# Patient Record
Sex: MALE | Race: WHITE | URBAN - METROPOLITAN AREA
[De-identification: names, ages, dates, MRNs, and addresses within clinical notes are randomized per-mention and may not be internally consistent; named-entity substitution may affect disease eponyms.]

---

## 2017-01-07 ENCOUNTER — TRANSFERRED RECORDS (OUTPATIENT)
Dept: HEALTH INFORMATION MANAGEMENT | Facility: CLINIC | Age: 32
End: 2017-01-07

## 2017-01-09 ENCOUNTER — MEDICAL CORRESPONDENCE (OUTPATIENT)
Dept: HEALTH INFORMATION MANAGEMENT | Facility: CLINIC | Age: 32
End: 2017-01-09

## 2017-03-03 ENCOUNTER — OFFICE VISIT (OUTPATIENT)
Dept: FAMILY MEDICINE | Facility: CLINIC | Age: 32
End: 2017-03-03

## 2017-03-03 DIAGNOSIS — M43.17 SPONDYLOLISTHESIS OF LUMBOSACRAL REGION: Primary | ICD-10-CM

## 2017-03-03 RX ORDER — HYDROCODONE BITARTRATE AND ACETAMINOPHEN 5; 325 MG/1; MG/1
1 TABLET ORAL
Qty: 15 TABLET | Refills: 0 | Status: SHIPPED | OUTPATIENT
Start: 2017-03-03

## 2017-03-03 ASSESSMENT — ANXIETY QUESTIONNAIRES
2. NOT BEING ABLE TO STOP OR CONTROL WORRYING: NOT AT ALL
1. FEELING NERVOUS, ANXIOUS, OR ON EDGE: NOT AT ALL
6. BECOMING EASILY ANNOYED OR IRRITABLE: NOT AT ALL
7. FEELING AFRAID AS IF SOMETHING AWFUL MIGHT HAPPEN: NOT AT ALL
IF YOU CHECKED OFF ANY PROBLEMS ON THIS QUESTIONNAIRE, HOW DIFFICULT HAVE THESE PROBLEMS MADE IT FOR YOU TO DO YOUR WORK, TAKE CARE OF THINGS AT HOME, OR GET ALONG WITH OTHER PEOPLE: SOMEWHAT DIFFICULT
5. BEING SO RESTLESS THAT IT IS HARD TO SIT STILL: NOT AT ALL
3. WORRYING TOO MUCH ABOUT DIFFERENT THINGS: NOT AT ALL
GAD7 TOTAL SCORE: 0

## 2017-03-03 ASSESSMENT — PAIN SCALES - GENERAL: PAINLEVEL: SEVERE PAIN (7)

## 2017-03-03 ASSESSMENT — PATIENT HEALTH QUESTIONNAIRE - PHQ9: 5. POOR APPETITE OR OVEREATING: NOT AT ALL

## 2017-03-03 NOTE — PROGRESS NOTES
HPI:       Castro Martinez is a 31 year old who presents for the following  Patient presents with:  Back Pain    [facility]. He seeks care on his back pain.     He reports he was told he has unstable spondylolisthesis of L5-S1, and his back pain is impacting his quality of life. He is requesting a consult with a surgeon to discuss what surgery would look like even though he knows he is not at this point now, what might it look like in the future. He reports there are activites in the past that he'd like to resume, lifting heavy weights, running, outdoor activities, rock climbing, and wrestling and he is wondering if having surgery would allow him to be more active.   Back Pain      Duration: Patient reports back pain started in [id number] while he was in the  and doing heavy lifting.         Specific cause: lifting    Description:   Location of pain: low back right  Character of pain: sharp, dull ache and constant. He feels the low back pain also creates a muscle tension pain for his upper back. On days with severe pain, it impacts his sleep and he has difficulty sleeping.  Pain radiation:radiates into the right leg and radiates into the left leg, he reports the bilateral radiation wraps around the back of his thighs then wraps around toward the front of his lower legs, it is a numb feeling.   New numbness or weakness in legs, not attributed to pain:  No   Any new bowel or bladder incontinence?    No     Intensity: Currently 4/10, At its worst 9/10    History:   Pain interferes with job/home/school: No, on a bad day of pain he can work from home.    History of back problems: Has seen previous providers and was told he has unstable spondylolisthesis at L5-S1 with a 9-12 mm shift in L5-S1. He has seen previous providers, some giving him activity restriction such as telling him not to run and be active while others tell him he  "can. He would ultimately like a surgery consult to discuss at what point might he need surgery even though he knows that time is not now. He would like a surgeons opinion on what activity level is appropriate. He would like to find out more on what surgery would be like if he needs surgery. He feels he been given conflicting advice by different providers and would like a surgeons input.  Any previous MRI or X-rays: Yes- at [facility].  Date 1/7/17 Flexion and extension xrays of lumbar spine. [facility] Also had MRI done in 2012 in [place]. In the past he had xrays done in 2008/2009 and was initially told he had DDD.   Sees a specialist for back pain:  No, did see pain management and opted out of an injection. [facility] suggested an [facility] as his next step.  Therapies tried without relief: acetaminophen (Tylenol), activity, cold, heat, muscle relaxants, NSAIDs, Physical Therapy and stretch. He reports he's taken enough Flexeril \"to kill a mule.\" While he was in the  they provided him with various NSAIDs, he recalls taking Ibuprofen 800 mg po 3-4 x a day. He reports this was hard on his stomach. Flexeril makes him groggy the following day. He still uses a NSAID for the regular day to day pain.      Alleviating factors:   Improved by: opioids and stretch, decompression helps, he saw a chiropractor in 2012/2013 and this seemed to help, yoga helps. He knows what he needs to do to help alleviate the pain when pain is severe. He uses a foam roller, also a soft ball to loosen up hips, decompressing and touching toes, takes pressure off with laying flat. He did PT in 2016, [facility] and [facility] session through  of . He still does the exercises at home that he was taught. He takes his NSAIDs 4 days a week, doesn't use them daily.    Precipitating factors:  Worsened by: Lifting, Bending, Standing and Sitting    Accompanying Signs & Symptoms:  Risk of Fracture: No   Risk of " Cauda Equina:No   Risk of Infection:  No   Risk of Cancer:  No        Problem, Medication and Allergy Lists were reviewed and are current.  Patient is a new patient to this clinic and so  I reviewed/updated the Past Medical History, the Family History and the Social History.          Review of Systems:   Review of Systems    ROS: 10 point ROS neg other than the symptoms noted above in the HPI.         Physical Exam:     Patient Vitals for the past 24 hrs:   BP Pulse   03/06/17 0819 127/85 74     Body mass index is 31.42 kg/(m^2).  Vitals were reviewed and were normal     Physical Exam   Constitutional: He is oriented to person, place, and time. He appears well-developed and well-nourished.   HENT:   Head: Normocephalic and atraumatic.   Mouth/Throat: Oropharynx is clear and moist.   Eyes: Pupils are equal, round, and reactive to light.   Neck: Neck supple.   Cardiovascular: Normal rate, regular rhythm and normal heart sounds.    Pulmonary/Chest: Effort normal and breath sounds normal.   Musculoskeletal:        Lumbar back: He exhibits tenderness, pain and spasm. He exhibits no edema and no deformity.   Lymphadenopathy:     He has no cervical adenopathy.   Neurological: He is alert and oriented to person, place, and time. He has normal strength. No cranial nerve deficit or sensory deficit.   Reflex Scores:       Patellar reflexes are 2+ on the right side and 2+ on the left side.  Negative SLR test   Skin: Skin is warm and dry.   Psychiatric: He has a normal mood and affect. His behavior is normal.         Results:      Results from the last 24 hoursNo results found for this or any previous visit (from the past 24 hour(s)).  Assessment and Plan     Castro was seen today for back pain.    Diagnoses and all orders for this visit:    1. Spondylolisthesis of lumbosacral region  Hank, 31 year old male with back pain that started in 2004 while working in the  reports hx of unstable spondylolisthesis diagnosed  [facility] and [facility], I was unable to view that imaging today. JULIUS score today is 17/50 = 34%, Global Health score is 27.   Discussed getting MRI and getting his Xray images done 2 months ago on a a disk to prepare and give all the needed information to the surgeon to help prepare for his visit. Overall, patient would like a surgeons opinion on how bad his back is with his unstable spondylolisthesis, if/when he would need surgery, and how he can go about getting his life back with resuming the activities he enjoys. He pain ranges from mild to some days severe. He does not take NSAIDs, daily, 4 x a week. He was provided small amount of Norco for days when the pain is severe.   - SPINE SURGERY REFERRAL  - HYDROcodone-acetaminophen (NORCO) 5-325 MG per tablet; Take 1 tablet by mouth nightly as needed for moderate to severe pain  Dispense: 15 tablet; Refill: 0  - MR Lumbar Spine w/o Contrast; Future    Educated patient to continue to monitor, reviewed handout below, all questions answered. Call clinic if worsening or no improvement. FU with spine consult per patient request. Ordered MRI through [facility], patient will get all images including MRI on disk.      There are no discontinued medications.  Options for treatment and follow-up care were reviewed with the patient. Castro Martinez  engaged in the decision making process and verbalized understanding of the options discussed and agreed with the final plan.    Breanne Ariza, LESA    Patient Instructions       Common Spine and Disk Problems  The most common serious back problems happen when disks tear, bulge, or rupture. In such cases, an injured disk can no longer cushion the vertebrae and absorb shock. As a result, the rest of your spine may also weaken. This can lead to pain, stiffness, and other symptoms.     Torn annulus      Contained herniated disk      Extruded herniated disk     Torn annulus. A sudden movement may cause a  tiny tear in an annulus. Nearby ligaments may stretch.    Contained herniated disk. As a disk wears out, the nucleus may bulge into the annulus and press on nerves.    Extruded herniated disk. When a disk ruptures, its nucleus can squeeze out and irritate a nerve.     Arthritis      Instability      Spondylolisthesis     Arthritis. As disks wear out over time, bone spurs form. These growths can irritate nerves and inflame facets.    Instability. As a disk stretches, the vertebrae slip back and forth. This can put pressure on the annulus.    Spondylolisthesis. Listhesis is a condition in which one vertebra has moved forward or backward, in relation to the one above or below it. This causes a crack (stress fracture) in the areas that link the vertebrae together. This may put pressure on the annulus, stretch the disk, and irritate nerves.    8657-5235 [employer]. [address] [place] [address]. All rights reserved. This information is not intended as a substitute for professional medical care. Always follow your healthcare professional's instructions.

## 2017-03-03 NOTE — MR AVS SNAPSHOT
After Visit Summary   3/3/2017    Castro Martinez    MRN: [id number]           Patient Information     Date Of Birth          1985        Visit Information        Provider Department      3/3/2017 1:00 PM Breanne Ariza NP [facility]        Today's Diagnoses     Spondylolisthesis of lumbosacral region    -  1       Follow-ups after your visit        Additional Services     SPINE SURGERY REFERRAL       Please choose Medical Spine Specialist (unless patient was seen by a Medical Spine Specialist within the past 6 months).  Surgical Evaluation is advised if the patient presents with one or more of the following red flags:     **Cauda Equina Syndrome  **Evidence of Spinal Tumor  **Fracture  **Infection  **Loss of Bowel or Bladder Control  **Sudden or Progressive Weakness  **Any other documented emergent neurological condition resulting from a Lumbar Spinal Condition.    You have been referred to: Spine Lumbar: Spine Surgeon: [facility]: Neurosurgery Clinic [facility] [phone]   [email or web address]    Please be aware that coverage of these services is subject to the terms and limitations of your health insurance plan.  Call member services at your health plan with any benefit or coverage questions.      Please bring the following to your appointment:    **Any x-rays, CTs or MRIs which have been performed.  Contact the facility where they were done to arrange for  prior to your scheduled appointment.    **List of current medications   **This referral request   **Any documents/labs given to you regarding this referral                  Who to contact     Please call your clinic at [phone] to:    Ask questions about your health    Make or cancel appointments    Discuss your medicines    Learn about your test results    Speak to your doctor   If you have compliments or concerns about an experience at your clinic, or if you wish to file a  "complaint, please contact [facility] Patient Relations at [phone] or email us at [email or web address]         Additional Information About Your Visit        [employer] Information     [employer] is an electronic gateway that provides easy, online access to your medical records. With [employer], you can request a clinic appointment, read your test results, renew a prescription or communicate with your care team.     To sign up for [employer] visit the website at [email or web address]   You will be asked to enter the access code listed below, as well as some personal information. Please follow the directions to create your username and password.     Your access code is: [email or web address]  Expires: 2017  2:02 PM     Your access code will  in 90 days. If you need help or a new code, please contact your [facility] or call [phone] for assistance.        Care EveryWhere ID     This is your Care EveryWhere ID. This could be used by other organizations to access your [place] medical records  [id number]        Your Vitals Were     Pulse Height Pulse Oximetry BMI (Body Mass Index)          93 5' 7.6\" (171.7 cm) 96% 31.42 kg/m2         Blood Pressure from Last 3 Encounters:   17 145/87    Weight from Last 3 Encounters:   17 204 lb 3.2 oz (92.6 kg)              We Performed the Following     SPINE SURGERY REFERRAL        Primary Care Provider    Md Wendi Castro                Thank you!     Thank you for choosing [facility]  for your care. Our goal is always to provide you with excellent care. Hearing back from our patients is one way we can continue to improve our services. Please take a few minutes to complete the written survey that you may receive in the mail after your visit with us. Thank you!             Your Updated Medication List - Protect others around you: Learn how to safely use, store and throw away your " medicines at www.disposemymeds.org.          This list is accurate as of: 3/3/17  2:02 PM.  Always use your most recent med list.                   Brand Name Dispense Instructions for use    ALEVE PO      Take 220 mg by mouth 2 times daily (with meals)

## 2017-03-04 ASSESSMENT — PATIENT HEALTH QUESTIONNAIRE - PHQ9: SUM OF ALL RESPONSES TO PHQ QUESTIONS 1-9: 4

## 2017-03-04 ASSESSMENT — ANXIETY QUESTIONNAIRES: GAD7 TOTAL SCORE: 0

## 2017-03-06 VITALS
SYSTOLIC BLOOD PRESSURE: 127 MMHG | BODY MASS INDEX: 30.95 KG/M2 | OXYGEN SATURATION: 96 % | HEIGHT: 68 IN | HEART RATE: 74 BPM | WEIGHT: 204.2 LBS | DIASTOLIC BLOOD PRESSURE: 85 MMHG

## 2017-03-06 PROBLEM — M43.17 SPONDYLOLISTHESIS OF LUMBOSACRAL REGION: Status: ACTIVE | Noted: 2017-03-06

## 2017-03-17 ENCOUNTER — TRANSFERRED RECORDS (OUTPATIENT)
Dept: HEALTH INFORMATION MANAGEMENT | Facility: CLINIC | Age: 32
End: 2017-03-17

## 2017-03-20 ENCOUNTER — TELEPHONE (OUTPATIENT)
Dept: FAMILY MEDICINE | Facility: CLINIC | Age: 32
End: 2017-03-20

## 2017-03-20 DIAGNOSIS — M43.17 SPONDYLOLISTHESIS OF LUMBOSACRAL REGION: Primary | ICD-10-CM

## 2017-03-20 NOTE — TELEPHONE ENCOUNTER
Called Hank and reviewed [facility] imaging report received on his lumbar spine without contrast mri. Discussed severe L5-S1 disc degeneration with 7 mm chronic spondylolytic spondylolisthesis and foraminal stenosis due to spondylolisthesis and foraminal disc bulge/protrusion. Referral to spine surgeon provided. He will bring all imaging with him to appointment of MRI and Xray of lumbar. Call back if questions or any difficulty getting referral to spine surgery.

## 2017-03-23 ENCOUNTER — PRE VISIT (OUTPATIENT)
Dept: ORTHOPEDICS | Facility: CLINIC | Age: 32
End: 2017-03-23

## 2017-03-23 NOTE — TELEPHONE ENCOUNTER
1.  Date/reason for appt: 4/27/17- DDD lumbar and spondy and stenosis    2.  Referring provider: Breanne Ariza NP - internal referral, office note 3/3/17 in [facility].     3.  Call to patient (Yes / No - short description): Yes, LM to call back. Need IMMANUEL's for outside records.     4.  Previous care at / records requested [facility]:     [facility] - [facility]   2. [facility] - Faxed to push images   3. Imaging done in [place] - Need to know name of location and IMMANUEL

## 2017-03-24 NOTE — TELEPHONE ENCOUNTER
Radiology reports received.   MR lumbar on 3/17/17-[facility]  Xray lumbar on 1/7/17-[facility]  Lumbar spine on 2/9/16-[place]  Cervical on 2/9/16- [facility]

## 2017-03-24 NOTE — TELEPHONE ENCOUNTER
Imaging was uploaded to PACS from [facility].     MR L Spine 3/17/17  XR L Spine 1/7/17  XR L Spine 12/2/13

## 2017-03-24 NOTE — TELEPHONE ENCOUNTER
Possible records at [facility]. - Need IMMANUEL.     E-mailed [facility] to pull images from [facility].

## 2017-03-28 NOTE — TELEPHONE ENCOUNTER
Spoke with patient on the phone - No previous back surgery.     Records are with:    1. [facility]   2. [place]  3. [facility]     E-mailed IMMANUEL's to sign - [email or web address]

## 2017-04-04 NOTE — TELEPHONE ENCOUNTER
Records received from [place].   Included  Office notes: 2/17/16, 2/9/16, 5/2/15   PT notes: 3/1/16   [facility] notes: 2/29/16, 2/18/16, 12/17/15  Radiology reports: xray lumbar on 2/9/16   Xray ribs on 5/2/15

## 2017-04-04 NOTE — TELEPHONE ENCOUNTER
Forwarded the radiology reports from [facility] to [facility].     Only received 2 reports  -4/21/15 MRI L Spine  -2/5/15 Xray Lower Spine

## 2017-04-04 NOTE — TELEPHONE ENCOUNTER
[facility] sent [facility] to [facility].     -12/2/13 - Spine Lumbosacral   -2/5/15 - Xray Lower Spine  -1/7/17 Non [facility] Lumbar Spine  -4/21/15 MR L Spine

## 2017-04-05 NOTE — TELEPHONE ENCOUNTER
Records received from [facility].   Included  Office notes: 4/28/16, 11/18/15, 2/5/15   PT note on 3/26/15, 2/19/15  Radiology reports: MRI lumbar on 4/21/15   Lumbar on 2/5/16   Xray lumbar on 1/7/17-[facility]   Lumbar on 12/2/13

## 2017-04-13 NOTE — TELEPHONE ENCOUNTER
Radiology reports received from [facility] - Forwarded to Ortho Clinic.   (Waiting for imaging disc.)     Reports include:  Elbow: 5/24/10 (appears unrelated)   Ankle: 5/24/10, 7/22/11  (appears unrelated)   Spine Lumbar: 9/17/10, 12/2/13  MR L-Spine: 10/29/10  MR Any Joint: 10/29/10, 01/07/11  (appears unrelated)   CT Maxillofacial: 11/5/10  (appears unrelated)   CT Head: 11/5/10  (appears unrelated)   Spine Cervical: 12/2/13  Spine Thoracic: 12/2/13

## 2017-04-13 NOTE — TELEPHONE ENCOUNTER
Records received from [facility] - Forwarded records to Ortho.     Records include:   Consult Requests  Progress Notes [id number]  Acupuncture Notes 1/14/13  PT notes    Op Note Septoplasty 3/31/11 (Not Related)     Missing: Radiology reports and imaging

## 2017-04-17 NOTE — TELEPHONE ENCOUNTER
Disc received from [facility] - sent to hospital film room.     18 studies   Exam dates: 5/24/10-12/02/13

## 2017-04-27 ENCOUNTER — OFFICE VISIT (OUTPATIENT)
Dept: ORTHOPEDICS | Facility: CLINIC | Age: 32
End: 2017-04-27

## 2017-04-27 VITALS — HEIGHT: 68 IN | WEIGHT: 206.6 LBS | BODY MASS INDEX: 31.31 KG/M2

## 2017-04-27 DIAGNOSIS — M43.17 SPONDYLOLISTHESIS OF LUMBOSACRAL REGION: Primary | ICD-10-CM

## 2017-04-27 DIAGNOSIS — M54.9 BACK PAIN, UNSPECIFIED BACK LOCATION, UNSPECIFIED BACK PAIN LATERALITY, UNSPECIFIED CHRONICITY: Primary | ICD-10-CM

## 2017-04-27 ASSESSMENT — ENCOUNTER SYMPTOMS
BACK PAIN: 1
NECK PAIN: 1
MUSCLE WEAKNESS: 0
JOINT SWELLING: 0
MYALGIAS: 0
STIFFNESS: 0
ARTHRALGIAS: 0
MUSCLE CRAMPS: 0

## 2017-04-27 NOTE — MR AVS SNAPSHOT
After Visit Summary   2017    Castro Martinez    MRN: [id number]           Patient Information     Date Of Birth          1985        Visit Information        Provider Department      2017 2:20 PM Bartolo Carmona MD [facility]        Today's Diagnoses     Spondylolisthesis of lumbosacral region    -  1       Follow-ups after your visit        Follow-up notes from your care team     Return if symptoms worsen or fail to improve.      Who to contact     Please call your clinic at [phone] to:    Ask questions about your health    Make or cancel appointments    Discuss your medicines    Learn about your test results    Speak to your doctor   If you have compliments or concerns about an experience at your clinic, or if you wish to file a complaint, please contact [facility] Patient Relations at [phone] or email us at [email or web address]         Additional Information About Your Visit        MyChart Information     [employer] is an electronic gateway that provides easy, online access to your medical records. With [facility], you can request a clinic appointment, read your test results, renew a prescription or communicate with your care team.     To sign up for [employer] visit the website at [email or web address]   You will be asked to enter the access code listed below, as well as some personal information. Please follow the directions to create your username and password.     Your access code is: [email or web address]  Expires: 2017  3:02 PM     Your access code will  in 90 days. If you need help or a new code, please contact your [facility] or call [phone] for assistance.        Care EveryWhere ID     This is your Care EveryWhere ID. This could be used by other organizations to access your [place] medical records  [id number]        Your Vitals Were     Height BMI (Body Mass  "Index)                1.72 m (5' 7.72\") 31.68 kg/m2           Blood Pressure from Last 3 Encounters:   03/06/17 127/85    Weight from Last 3 Encounters:   04/27/17 93.7 kg (206 lb 9.6 oz)   03/03/17 92.6 kg (204 lb 3.2 oz)              Today, you had the following     No orders found for display       Primary Care Provider    Md Other Clinic                Thank you!     Thank you for choosing [facility]  for your care. Our goal is always to provide you with excellent care. Hearing back from our patients is one way we can continue to improve our services. Please take a few minutes to complete the written survey that you may receive in the mail after your visit with us. Thank you!             Your Updated Medication List - Protect others around you: Learn how to safely use, store and throw away your medicines at www.disposemymeds.org.          This list is accurate as of: 4/27/17 11:59 PM.  Always use your most recent med list.                   Brand Name Dispense Instructions for use    ALEVE PO      Take 220 mg by mouth 2 times daily (with meals)       HYDROcodone-acetaminophen 5-325 MG per tablet    NORCO    15 tablet    Take 1 tablet by mouth nightly as needed for moderate to severe pain         "

## 2017-04-27 NOTE — PROGRESS NOTES
CHIEF COMPLAINT:  Low back pain.      HISTORY OF PRESENT ILLNESS:  The patient is a very pleasant, otherwise healthy, 32-year-old gentleman who has a known history of lytic spondylolisthesis at the L5-S1 junction.  The patient was previously seen by Dr. Carmona 2 years ago at the [facility], where it was recommended that the patient undergo physical therapy and use NSAIDs for pain relief.  He has done this, and overall has done very well.  He has come in today for followup examination and to further discuss any restrictions.      In the interval time period, he has not had much change in his symptoms.  He has occasional right-sided numbness and tingling running down the posterior aspect of his leg, which occurs about once a month.  He has fairly frequent focal low back pain described as an ache that is worse with activities.  He only takes Aleve p.o. for pain relief.  He is not taking any narcotics.  He has had no significant trauma.      PAST MEDICAL HISTORY:  None.      PAST SURGICAL HISTORY:  None.      ALLERGIES:  None.      FAMILY HISTORY:  Noncontributory.      SOCIAL HISTORY:  The patient denies tobacco use.  He drinks alcohol only occasionally.  He is currently fully employed, and has a sedentary job.  [facility]      REVIEW OF SYSTEMS:  A 10-point review of systems was negative; see the addendum below.      PHYSICAL EXAMINATION:  In general, he is well-developed, well-nourished, in no acute distress.  Weight today is 206 with a calculated BMI of 31.7.  He communicates and articulates well and with a normal affect.  He is breathing comfortably on room air.  His pulse is regular rate and rhythm.  He has normal skin with no evidence of skin breakdown or rash.  Focused exam of his spine reveals that he has no areas that are focally tender to palpation; no tenderness along the midline lumbar spine, and no tenderness along the paraspinal musculature.  There is no quadratus lumborum spasming.   Hyperextension of the lumbar spine does not produce any pain, and facet loading does not produce any pain symptoms.  He has 5/5 strength in his bilateral extremities grossly, and sensation is fully intact.  He has a normal reciprocal heel-toe gait.      IMAGING:  We have a full-length standing film from today available for review, which demonstrates a chronic lytic spondylolisthesis of L5 on S1.  There are extension/flexion films from 01/7/2017 available for review, which demonstrate approximately 4 mm translation of L5 on S1 with extension/flexion, suggesting very mild instability.      There is an MRI available for review from 03/17/2017.  This demonstrates degenerative disc at the L5-S1 level.  Laterally, there is some mild stenosis of the foramen bilaterally at L5-S1.  The remaining discs appear very healthy.  There is no central canal stenosis identified.      ASSESSMENT:  A 32-year-old gentleman, otherwise healthy, with chronic lytic spondylolisthesis L5-S1 with minimal symptoms.      PLAN:  We had a long conversation with the patient regarding the above.  At this juncture, we certainly would not recommend surgical intervention, as his symptoms are pretty mild and are well controlled with oral pain medications only.  He has already done physical therapy, and states that he keeps himself healthy doing physical therapy on his own, as well as going to the gym.      We had no specific restrictions to offer him.  He may do whatever activity he wishes, and our only recommendation is to allow his pain symptoms to guide him; so if a particular activity causes focal low back pain, then we would advise him to ease up on that particular activity.      The patient may follow up with us on a p.r.n. basis.   Dictated by Frankie Cage MD, Resident       Addendum:  I personally saw and evaluated the patient with PGY-4 [place], and I agree with findings and plan outlined in the note.  Previously diagnosed with L5-S1 isthmic  spondylolisthesis and foraminal stenosis.  Previously seen at [facility] ago, agreed to continued with nonoperative treatment.  At present, symptoms not disabling, and he is not desiring surgical treatment.  We had a good discussion regarding his condition, prognosis down the road, what he can and should not be doing, etc.  I tried to answer his questions as best as I could and to his satisfaction.  RTC prn if symptoms get worse and would like to discuss / consider surgical options.  TT > 30 mins, > 50% CC.

## 2017-04-27 NOTE — LETTER
4/27/2017     RE: Castro Martinez  [address]   [facility] [address]     Dear Colleague,    Thank you for referring your patient, Castro Martinez, to the [facility] at [facility]. Please see a copy of my visit note below.    CHIEF COMPLAINT:  Low back pain.      HISTORY OF PRESENT ILLNESS:  The patient is a very pleasant, otherwise healthy, 32-year-old gentleman who has a known history of lytic spondylolisthesis at the L5-S1 junction.  The patient was previously seen by Dr. Carmona 2 years ago at the [facility], where it was recommended that the patient undergo physical therapy and use NSAIDs for pain relief.  He has done this, and overall has done very well.  He has come in today for followup examination and to further discuss any restrictions.      In the interval time period, he has not had much change in his symptoms.  He has occasional right-sided numbness and tingling running down the posterior aspect of his leg, which occurs about once a month.  He has fairly frequent focal low back pain described as an ache that is worse with activities.  He only takes Aleve p.o. for pain relief.  He is not taking any narcotics.  He has had no significant trauma.      PAST MEDICAL HISTORY:  None.      PAST SURGICAL HISTORY:  None.      ALLERGIES:  None.      FAMILY HISTORY:  Noncontributory.      SOCIAL HISTORY:  The patient denies tobacco use.  He drinks alcohol only occasionally.  He is currently fully employed, and has a sedentary job.  [facility].      REVIEW OF SYSTEMS:  A 10-point review of systems was negative; see the addendum below.      PHYSICAL EXAMINATION:  In general, he is well-developed, well-nourished, in no acute distress.  Weight today is 206 with a calculated BMI of 31.7.  He communicates and articulates well and with a normal affect.  He is breathing comfortably on room air.  His pulse is regular rate  and rhythm.  He has normal skin with no evidence of skin breakdown or rash.  Focused exam of his spine reveals that he has no areas that are focally tender to palpation; no tenderness along the midline lumbar spine, and no tenderness along the paraspinal musculature.  There is no quadratus lumborum spasming.  Hyperextension of the lumbar spine does not produce any pain, and facet loading does not produce any pain symptoms.  He has 5/5 strength in his bilateral extremities grossly, and sensation is fully intact.  He has a normal reciprocal heel-toe gait.      IMAGING:  We have a full-length standing film from today available for review, which demonstrates a chronic lytic spondylolisthesis of L5 on S1.  There are extension/flexion films from 01/7/2017 available for review, which demonstrate approximately 4 mm translation of L5 on S1 with extension/flexion, suggesting very mild instability.      There is an MRI available for review from 03/17/2017.  This demonstrates degenerative disc at the L5-S1 level.  Laterally, there is some mild stenosis of the foramen bilaterally at L5-S1.  The remaining discs appear very healthy.  There is no central canal stenosis identified.      ASSESSMENT:  A 32-year-old gentleman, otherwise healthy, with chronic lytic spondylolisthesis L5-S1 with minimal symptoms.      PLAN:  We had a long conversation with the patient regarding the above.  At this juncture, we certainly would not recommend surgical intervention, as his symptoms are pretty mild and are well controlled with oral pain medications only.  He has already done physical therapy, and states that he keeps himself healthy doing physical therapy on his own, as well as going to the gym.      We had no specific restrictions to offer him.  He may do whatever activity he wishes, and our only recommendation is to allow his pain symptoms to guide him; so if a particular activity causes focal low back pain, then we would advise him to ease  up on that particular activity.      The patient may follow up with us on a p.r.n. basis.   Dictated by Frankie Cage MD, Resident       Addendum:  I personally saw and evaluated the patient with PGY-4 [place], and I agree with findings and plan outlined in the note.  Previously diagnosed with L5-S1 isthmic spondylolisthesis and foraminal stenosis.  Previously seen at [facility] 2 years ago, agreed to continued with nonoperative treatment.  At present, symptoms not disabling, and he is not desiring surgical treatment.  We had a good discussion regarding his condition, prognosis down the road, what he can and should not be doing, etc.  I tried to answer his questions as best as I could and to his satisfaction.  RTC prn if symptoms get worse and would like to discuss / consider surgical options.  TT > 30 mins, > 50% CC.      Again, thank you for allowing me to participate in the care of your patient.      Sincerely,    Bartolo Carmona MD

## 2017-04-27 NOTE — NURSING NOTE
"Reason For Visit:   Chief Complaint   Patient presents with     Back Pain     low back pain       Primary MD: Dr. Mireles, [facility]    Ref. MD: RONALD      Occupation [employer] .  Currently working? Yes.  Work status?  Full time.  Date of injury: ongoing    Date of surgery: none    Smoker: No      Ht 1.72 m (5' 7.72\")  Wt 93.7 kg (206 lb 9.6 oz)  BMI 31.68 kg/m2    Pain Assessment  Patient Currently in Pain: Yes  0-10 Pain Scale: 4  Primary Pain Location: Back  Pain Orientation: Lower  Other Pain Locations: right hip  Pain Descriptors: Radiating, Sharp  Alleviating Factors: Other (comment), Pain medication (traction, time, rolling pin massage)  Aggravating Factors: Other (comment) (activity)    Oswestry (JULIUS) Questionnaire    OSWESTRY DISABILITY INDEX 4/27/2017   Section 1 - Pain intensity -   Section 2 - Personal care (washing, dressing, etc.)  -   Section 3 - Lifting -   Section 4 - Walking -   Section 5 - Sitting -   Section 6 - Standing -   Section 7 - Sleeping -   Section 8 - Sex life (if applicable) -   Section 9 - Social life -   Section 10 - Traveling -   Count -   Sum -   Oswestry Score (%) -   SECTION 1-PAIN INTENSITY 1  The pain is very mild at the moment.   SECTION 2-PERSONAL CARE (WASHING,DRESSING,ETC.) 0  I can look after myself normally without causing additional pain.   SECTION 3-LIFTING 1  I can lift heavy weights but it gives me additional pain.   SECTION 4-WALKING 1  Pain prevents me from walking more than one mile.   SECTION 5-SITTING 2  Pain prevents me from sitting for more than 1 hour   SECTION 6-STANDING 2  Pain prevents me from standing for more than 1 hour.   SECTION 7-SLEEPING 2  Because of pain I have less than 6 hours sleep.   SECTION 8-SEX LIFE (IF APPLICABLE) 2  My sex life is nearly normal but is very painful.   SECTION 9-SOCIAL LIFE 2  Pain has no significant effect on my social life apart from limiting my more energetic interests e.g. sport, etc.   SECTION 10-TRAVELING " 2  Pain is bad but I am able to manage trips over two hours.   Oswestry Disability Index: Count 10   JULIUS: Total Score = SUM (total for answered questions) 15   Computed Oswestry Score 30 (%)                    Numeric Rating Scale:  VAS Scores     VAS Survey 4/27/2017   What is your level of back pain during the last week: 4.5   What is your level of RIGHT leg pain during the last week: 0   What is your level of LEFT leg pain during the last week: 0   What is your level of neck pain during the last week: 3.0   What is your level of RIGHT arm pain during the last week: 0   What is your level of LEFT arm pain during the last week: 0                Promis 10 Assessment    PROMIS 10 4/27/2017   In general, would you say your health is: Good = 3   In general, would you say your quality of life is: Good = 3   In general, how would you rate your physical health? Fair = 2   In general, how would you rate your mental health, including your mood and your ability to think? Very good = 4   In general, how would you rate your satisfaction with your social activities and relationships? Fair = 2   In general, please rate how well you carry out your usual social activities and roles Fair = 2   To what extent are you able to carry out your everyday physical activities such as walking, climbing stairs, carrying groceries, or moving a chair? Moderately = 3   How often have you been bothered by emotional problems such as feeling anxious, depressed or irritable? Never = 5   How would you rate your fatigue on average? Moderate = 3   How would you rate your pain on average?   0 = No Pain  to  10 = Worst Imaginable Pain 6   Global Physical Health Score : Raw Score 11 (SUM : G03 - G06 - G07 - G08)   Global Mentall Health Score : Raw Score 14 (SUM : G02 - G04 - G05 - G10)   Total (Physical + Mental Health Score) 25   In general, would you say your health is: -   In general, would you say your quality of life is: -   In general, how would you  rate your physical health? -   In general, how would you rate your mental health, including your mood and your ability to think? -   In general, how would you rate your satisfaction with your social activities and relationships? -   In general, please rate how well you carry out your usual social activities and roles. (This includes activities at home, at work and in your community, and responsibilities as a parent, child, spouse, employee, friend, etc.) -   To what extent are you able to carry out your everyday physical activities such as walking, climbing stairs, carrying groceries, or moving a chair? -   In the past 7 days, how often have you been bothered by emotional problems such as feeling anxious, depressed, or irritable? -   In the past 7 days, how would you rate your fatigue on average? -   In the past 7 days, how would you rate your pain on average, where 0 means no pain, and 10 means worst imaginable pain? -   Global Mental Health Score -   Global Physical Health Score -   PROMIS TOTAL - SUBSCORES -   Pain question re-calculation - no clinical value -

## 2019-10-14 NOTE — PATIENT INSTRUCTIONS
Common Spine and Disk Problems  The most common serious back problems happen when disks tear, bulge, or rupture. In such cases, an injured disk can no longer cushion the vertebrae and absorb shock. As a result, the rest of your spine may also weaken. This can lead to pain, stiffness, and other symptoms.     Torn annulus      Contained herniated disk      Extruded herniated disk     Torn annulus. A sudden movement may cause a tiny tear in an annulus. Nearby ligaments may stretch.    Contained herniated disk. As a disk wears out, the nucleus may bulge into the annulus and press on nerves.    Extruded herniated disk. When a disk ruptures, its nucleus can squeeze out and irritate a nerve.     Arthritis      Instability      Spondylolisthesis     Arthritis. As disks wear out over time, bone spurs form. These growths can irritate nerves and inflame facets.    Instability. As a disk stretches, the vertebrae slip back and forth. This can put pressure on the annulus.    Spondylolisthesis. Listhesis is a condition in which one vertebra has moved forward or backward, in relation to the one above or below it. This causes a crack (stress fracture) in the areas that link the vertebrae together. This may put pressure on the annulus, stretch the disk, and irritate nerves.    4095-6189 [employer]. [address] [place] [address], [place] [address]. All rights reserved. This information is not intended as a substitute for professional medical care. Always follow your healthcare professional's instructions.         no distention/soft/nontender